# Patient Record
Sex: MALE | Race: WHITE | NOT HISPANIC OR LATINO | URBAN - NONMETROPOLITAN AREA
[De-identification: names, ages, dates, MRNs, and addresses within clinical notes are randomized per-mention and may not be internally consistent; named-entity substitution may affect disease eponyms.]

---

## 2016-04-15 PROBLEM — H40.1333: Noted: 2018-01-31

## 2016-04-15 PROBLEM — E11.3313: Noted: 2018-01-31

## 2019-07-23 ENCOUNTER — IMPORTED ENCOUNTER (OUTPATIENT)
Dept: URBAN - NONMETROPOLITAN AREA CLINIC 1 | Facility: CLINIC | Age: 54
End: 2019-07-23

## 2019-07-23 PROCEDURE — 92012 INTRM OPH EXAM EST PATIENT: CPT

## 2019-07-23 NOTE — PATIENT DISCUSSION
PRIMARY OPEN ANGLE GLAUCOMA: Severe/ Followed by [facility] 1 tube OD 2 tubes OS NVI OS keep appt with dr. Bandar Kothari out of target rangePC IOL OU monitorDM DM s DR-Stressed the importance of keeping blood sugars under control blood pressure under control and weight normalization and regular visits with PCP. -Explained the possible effects of poorly controlled diabetes and the damage that diabetes can cause to ocular health. -Patient to check HgbA1C.-Pt instructed to contact our office with any vision changes. Continue all GL MedsStrong Lan hx.; Dr's Notes: Daylin Harris

## 2020-11-17 ENCOUNTER — IMPORTED ENCOUNTER (OUTPATIENT)
Dept: URBAN - NONMETROPOLITAN AREA CLINIC 1 | Facility: CLINIC | Age: 55
End: 2020-11-17

## 2020-11-17 PROCEDURE — 92012 INTRM OPH EXAM EST PATIENT: CPT

## 2020-11-17 NOTE — PATIENT DISCUSSION
PRIMARY OPEN ANGLE GLAUCOMA: Severe/ Followed by [facility] 1 tube OD 2 tubes OS NVI OS keep appt with dr. Bradly Cody monitorDM s DR-Stressed the importance of keeping blood sugars under control blood pressure under control and weight normalization and regular visits with PCP. -Explained the possible effects of poorly controlled diabetes and the damage that diabetes can cause to ocular health. -Patient to check HgbA1C.-Pt instructed to contact our office with any vision changes. Continue all GL MedsStrong Lan hx.; Dr's Notes: Glen Mcclain

## 2021-01-05 ENCOUNTER — IMPORTED ENCOUNTER (OUTPATIENT)
Dept: URBAN - NONMETROPOLITAN AREA CLINIC 1 | Facility: CLINIC | Age: 56
End: 2021-01-05

## 2021-01-05 PROCEDURE — 92012 INTRM OPH EXAM EST PATIENT: CPT

## 2021-01-05 NOTE — PATIENT DISCUSSION
PRIMARY OPEN ANGLE GLAUCOMA: Severe/ Followed by [facility] 1 tube OD 2 tubes OS NVI OS keep appt with dr. Hammad kimDM s DR-Stressed the importance of keeping blood sugars under control blood pressure under control and weight normalization and regular visits with PCP. -Explained the possible effects of poorly controlled diabetes and the damage that diabetes can cause to ocular health. -Patient to check HgbA1C.-Pt instructed to contact our office with any vision changes. Continue all GL Meds[facility] Fam hx.; Dr's Notes: Horatio Hammans Dr. Jana Haste

## 2021-04-05 ENCOUNTER — IMPORTED ENCOUNTER (OUTPATIENT)
Dept: URBAN - NONMETROPOLITAN AREA CLINIC 1 | Facility: CLINIC | Age: 56
End: 2021-04-05

## 2021-04-05 PROCEDURE — 92012 INTRM OPH EXAM EST PATIENT: CPT

## 2021-04-05 NOTE — PATIENT DISCUSSION
PRIMARY OPEN ANGLE GLAUCOMA: Severe/ Followed by [facility] 1 tube OD 2 tubes OS NVI OS keep appt with dr. Agustin Gibbs monitor for changesDM s DR-Stressed the importance of keeping blood sugars under control blood pressure under control and weight normalization and regular visits with PCP. -Explained the possible effects of poorly controlled diabetes and the damage that diabetes can cause to ocular health. -Patient to check HgbA1C.-Pt instructed to contact our office with any vision changes. Continue all GL MedsScarey [place] hx.; Dr's Notes: [facility] Dr. Parminder Alvarez

## 2021-08-05 ENCOUNTER — IMPORTED ENCOUNTER (OUTPATIENT)
Dept: URBAN - NONMETROPOLITAN AREA CLINIC 1 | Facility: CLINIC | Age: 56
End: 2021-08-05

## 2021-08-05 PROBLEM — Z96.1: Noted: 2021-08-05

## 2021-08-05 PROBLEM — H40.033: Noted: 2021-08-05

## 2021-08-05 PROBLEM — H16.142: Noted: 2021-08-05

## 2021-08-05 PROBLEM — H40.1333: Noted: 2021-08-05

## 2021-08-05 PROBLEM — E11.3313: Noted: 2021-08-05

## 2021-08-05 PROCEDURE — 99213 OFFICE O/P EST LOW 20 MIN: CPT

## 2021-08-05 NOTE — PATIENT DISCUSSION
heavy pk os todayeducate pt on findingspossible due to preservatives in gttsstart pf qid os x 10 dayskeep maryann appt with [facility] OPEN ANGLE GLAUCOMA: Severe/ Followed by [facility] 1 tube OD 2 tubes OS NVI OS keep appt with dr. Reg Short OU monitor for changesDM s DR-Stressed the importance of keeping blood sugars under control blood pressure under control and weight normalization and regular visits with PCP. -Explained the possible effects of poorly controlled diabetes and the damage that diabetes can cause to ocular health. -Patient to check HgbA1C.-Pt instructed to contact our office with any vision changes. Continue all GL MedsStrong Fam hx.; Dr's Notes: [facility] Dr. Zee Gore sure to fax narratives from visits to Dr Chuck Colunga  [phone]. Patient would also like a copy at visits.  Jodi Nguyen

## 2021-09-08 ENCOUNTER — IMPORTED ENCOUNTER (OUTPATIENT)
Dept: URBAN - NONMETROPOLITAN AREA CLINIC 1 | Facility: CLINIC | Age: 56
End: 2021-09-08

## 2021-09-08 PROCEDURE — 99213 OFFICE O/P EST LOW 20 MIN: CPT

## 2021-09-08 NOTE — PATIENT DISCUSSION
heavy pk os todayeducate pt on findingspossible due to preservatives in gttsmild improvement with pfstart eyr maryam bid oskeep maryann appt with [facility] OPEN ANGLE GLAUCOMA: Severe/ Followed by [facility] 1 tube OD 2 tubes OS NVI OS keep appt with dr. Lola reyez todayPC IOL OU monitor for changesDM s DR-Stressed the importance of keeping blood sugars under control blood pressure under control and weight normalization and regular visits with PCP. -Explained the possible effects of poorly controlled diabetes and the damage that diabetes can cause to ocular health. -Patient to check HgbA1C.-Pt instructed to contact our office with any vision changes. Continue all GL MedsStrong Lan hx.; Dr's Notes: [facility] Dr. Rajat Dingke sure to fax narratives from visits to Dr Estella Vann  [phone]. Patient would also like a copy at visits.  Izabela Paz

## 2022-03-18 PROBLEM — J96.10 CHRONIC RESPIRATORY FAILURE (HCC): Status: ACTIVE | Noted: 2021-05-27

## 2022-03-18 PROBLEM — L03.119 LOWER EXTREMITY CELLULITIS: Status: ACTIVE | Noted: 2021-05-21

## 2022-03-19 PROBLEM — L97.509 FOOT ULCER (HCC): Status: ACTIVE | Noted: 2021-05-21

## 2022-03-19 PROBLEM — I73.9 PERIPHERAL ARTERIAL DISEASE (HCC): Status: ACTIVE | Noted: 2021-05-27

## 2022-03-19 PROBLEM — L03.116 CELLULITIS OF LEFT LOWER LEG: Status: ACTIVE | Noted: 2018-09-15

## 2022-03-19 PROBLEM — I50.33 DIASTOLIC CHF, ACUTE ON CHRONIC (HCC): Status: ACTIVE | Noted: 2021-05-27

## 2022-03-19 PROBLEM — N18.30 CKD (CHRONIC KIDNEY DISEASE) STAGE 3, GFR 30-59 ML/MIN (HCC): Status: ACTIVE | Noted: 2021-05-27

## 2022-03-19 PROBLEM — E66.01 MORBID OBESITY (HCC): Status: ACTIVE | Noted: 2021-05-21

## 2022-03-20 PROBLEM — H40.9 GLAUCOMA: Status: ACTIVE | Noted: 2021-05-27

## 2022-04-10 ASSESSMENT — TONOMETRY
OD_IOP_MMHG: 9
OD_IOP_MMHG: 10
OD_IOP_MMHG: 09
OD_IOP_MMHG: 12
OD_IOP_MMHG: 10
OS_IOP_MMHG: 12
OS_IOP_MMHG: 14
OD_IOP_MMHG: 15
OS_IOP_MMHG: 16
OD_IOP_MMHG: 14
OS_IOP_MMHG: 10
OD_IOP_MMHG: 16
OS_IOP_MMHG: 6
OS_IOP_MMHG: 7
OS_IOP_MMHG: 19
OS_IOP_MMHG: 8

## 2022-04-10 ASSESSMENT — VISUAL ACUITY
OD_CC: 20/70-1
OS_CC: CF2'
OS_CC: CF2'
OD_CC: 20/400
OS_CC: CF2'

## 2024-02-12 ENCOUNTER — FOLLOW UP (OUTPATIENT)
Dept: RURAL CLINIC 1 | Facility: CLINIC | Age: 59
End: 2024-02-12

## 2024-02-12 DIAGNOSIS — H16.142: ICD-10-CM

## 2024-02-12 DIAGNOSIS — H40.1333: ICD-10-CM

## 2024-02-12 DIAGNOSIS — Z96.1: ICD-10-CM

## 2024-02-12 PROCEDURE — 92014 COMPRE OPH EXAM EST PT 1/>: CPT

## 2024-02-12 ASSESSMENT — TONOMETRY
OS_IOP_MMHG: 14
OD_IOP_MMHG: 12

## 2025-04-14 ENCOUNTER — FOLLOW UP (OUTPATIENT)
Age: 60
End: 2025-04-14

## 2025-04-14 DIAGNOSIS — H16.142: ICD-10-CM

## 2025-04-14 DIAGNOSIS — Z96.1: ICD-10-CM

## 2025-04-14 DIAGNOSIS — H40.1333: ICD-10-CM

## 2025-04-14 PROCEDURE — 92014 COMPRE OPH EXAM EST PT 1/>: CPT
